# Patient Record
Sex: FEMALE | Race: BLACK OR AFRICAN AMERICAN | URBAN - METROPOLITAN AREA
[De-identification: names, ages, dates, MRNs, and addresses within clinical notes are randomized per-mention and may not be internally consistent; named-entity substitution may affect disease eponyms.]

---

## 2017-09-18 ENCOUNTER — TELEPHONE (OUTPATIENT)
Dept: OBGYN CLINIC | Age: 67
End: 2017-09-18

## 2017-09-18 DIAGNOSIS — Z12.39 SCREENING FOR BREAST CANCER: Primary | ICD-10-CM

## 2017-09-18 NOTE — TELEPHONE ENCOUNTER
Patient calling stating that she scheduled her AE for 9/29/17 but needs a mammogram order sent to [address] [place] [address] as it is scheduled for 10/2/17. Patient aware that an order will be sent.

## 2017-09-29 ENCOUNTER — OFFICE VISIT (OUTPATIENT)
Dept: OBGYN CLINIC | Age: 67
End: 2017-09-29

## 2017-09-29 VITALS
BODY MASS INDEX: 42.52 KG/M2 | SYSTOLIC BLOOD PRESSURE: 117 MMHG | WEIGHT: 240 LBS | HEART RATE: 96 BPM | RESPIRATION RATE: 19 BRPM | DIASTOLIC BLOOD PRESSURE: 70 MMHG | HEIGHT: 63 IN

## 2017-09-29 DIAGNOSIS — Z12.31 SCREENING MAMMOGRAM, ENCOUNTER FOR: ICD-10-CM

## 2017-09-29 DIAGNOSIS — Z12.4 SCREENING FOR CERVICAL CANCER: Primary | ICD-10-CM

## 2017-09-29 NOTE — PROGRESS NOTES
Annual exam ages 69+ post hysterectomy    Melody Samson is a No obstetric history on file. ,  73 y.o. female [address] whose No LMP recorded. Patient is postmenopausal.  was on  who presents for her annual checkup. She is having no significant problems. Hormonal status:  She is not having vasomotor symptoms. The patient is not using any ERT. Sexual history:     reports that she currently engages in sexual activity and has had male partners. Medical conditions:    Since her last annual GYN exam about one year ago, she has not the following changes in her health history: none. Pap and Mammogram History:    Her most recent Pap smear was normal obtained 5 year(s) ago. The patient has a mammogram scheduled. Breast Cancer History/Substance Abuse:  She has a personal h/o breast cancer. Tobacco History:  reports that she has never smoked. She has never used smokeless tobacco.  Alcohol Abuse:  reports that she drinks alcohol. Drug Abuse:  has no drug history on file. Osteoporosis History:    Family history does not include a first or second degree relative with osteopenia or osteoporosis. A bone density scan has not been previously obtained.        Past Medical History:   Diagnosis Date    Hx of mammogram 8/12/2013    NEG    Hypertension     Neoplasm of breast 1995    Pap smear, as part of routine gynecological examination 6/23/2011    NEG,HPV NEG     Past Surgical History:   Procedure Laterality Date    HX BREAST LUMPECTOMY  1995    HX GALEN AND BSO  6/1994     Current Outpatient Prescriptions   Medication Sig Dispense Refill    VENTOLIN HFA 90 mcg/actuation inhaler INHALE 2 PUFFS EVERY 4 HRS FOR WHEEZING OR SHORTNESS OF BREATH  1    fluticasone (FLONASE) 50 mcg/actuation nasal spray USE 1 SPRAY IN EACH NOSTRIL TWICE A DAY  2    montelukast (SINGULAIR) 10 mg tablet TAKE 1 TABLET BY MOUTH ONCE A DAY IN THE EVENING FOR 30 DAYS  2    hydrOXYzine (ATARAX) 10 mg tablet  hydrochlorothiazide (MICROZIDE) 12.5 mg capsule       amLODIPine (NORVASC) 10 mg tablet       Cetirizine (ZYRTEC) 10 mg cap Take  by mouth.  ibuprofen (MOTRIN) 800 mg tablet       tolterodine ER (DETROL LA) 4 mg ER capsule Take 1 capsule by mouth daily. 30 capsule 12     Allergies: Ace inhibitors   Social History     Social History    Marital status:      Spouse name: N/A    Number of children: N/A    Years of education: N/A     Occupational History    Not on file. Social History Main Topics    Smoking status: Never Smoker    Smokeless tobacco: Never Used    Alcohol use Yes    Drug use: Not on file    Sexual activity: Yes     Partners: Male     Other Topics Concern    Not on file     Social History Narrative     There is no problem list on file for this patient.       Review of Systems - History obtained from the patient  Constitutional: negative for weight loss, fever, night sweats  HEENT: negative for hearing loss, earache, congestion, snoring, sorethroat  CV: negative for chest pain, palpitations, edema  Resp: negative for cough, shortness of breath, wheezing  GI: negative for change in bowel habits, abdominal pain, black or bloody stools  : negative for frequency, dysuria, hematuria, vaginal discharge  MSK: negative for back pain, joint pain, muscle pain  Breast: negative for breast lumps, nipple discharge, galactorrhea  Skin :negative for itching, rash, hives  Neuro: negative for dizziness, headache, confusion, weakness  Psych: negative for anxiety, depression, change in mood  Heme/lymph: negative for bleeding, bruising, pallor    Physical Exam    Visit Vitals    /70 (BP 1 Location: Left arm, BP Patient Position: Sitting)    Pulse 96    Resp 19    Ht 5' 3\" (1.6 m)    Wt 240 lb (108.9 kg)    BMI 42.51 kg/m2     Constitutional  · Appearance: well-nourished, well developed, alert, in no acute distress    HENT  · Head and Face: appears normal    Neck  · Inspection/Palpation: normal appearance, no masses or tenderness  · Lymph Nodes: no lymphadenopathy present    Chest  · Respiratory Effort: breathing normal    Breasts  · Inspection of Breasts: breasts asymmetrical, right smaller than left , no skin changes, no discharge present, nipple appearance normal, no skin retraction present  · Palpation of Breasts and Axillae: stable mass on the right at 1 oclock present on palpation, no breast tenderness  · Axillary Lymph Nodes: no lymphadenopathy present    Gastrointestinal  · Abdominal Examination: abdomen non-tender to palpation, normal bowel sounds, no masses present  · Liver and spleen: no hepatomegaly present, spleen not palpable  · Hernias: no hernias identified    Skin  · General Inspection: no rash, no lesions identified    Neurologic/Psychiatric  · Mental Status:  · Orientation: grossly oriented to person, place and time  · Mood and Affect: mood normal, affect appropriate    Assessment:  Routine gynecologic examination  Her current medical status is satisfactory with no evidence of significant gynecologic issues.     Plan:  Counseled re: diet, exercise, healthy lifestyle  Return for yearly wellness visits  Rec annual mammogram  Given script for right breast form

## 2017-10-03 DIAGNOSIS — Z12.39 SCREENING FOR BREAST CANCER: ICD-10-CM

## 2018-10-11 ENCOUNTER — TELEPHONE (OUTPATIENT)
Dept: OBGYN CLINIC | Age: 68
End: 2018-10-11

## 2018-10-11 DIAGNOSIS — N63.10 BREAST MASS, RIGHT: Primary | ICD-10-CM

## 2018-10-11 NOTE — TELEPHONE ENCOUNTER
Order placed as per MD order for the right breast ultrasound.     Order printed and signed and faxed to       Fax confirmation received

## 2018-10-11 NOTE — TELEPHONE ENCOUNTER
Call received at 830am    78year old patient last seen in the office on 9/29/17. Willem Richards from mammography calling to say that the patient had a diagnostic mammogram and needed to have a right breast ultrasound( done on 10/8/18) an that they need and order. ?ok for this nurse to place and order for the right breast ultrasound due to right breast mass.       Please advise

## 2024-05-30 NOTE — PATIENT INSTRUCTIONS
